# Patient Record
Sex: MALE | Race: AMERICAN INDIAN OR ALASKA NATIVE
[De-identification: names, ages, dates, MRNs, and addresses within clinical notes are randomized per-mention and may not be internally consistent; named-entity substitution may affect disease eponyms.]

---

## 2020-10-03 ENCOUNTER — HOSPITAL ENCOUNTER (EMERGENCY)
Dept: HOSPITAL 5 - ED | Age: 30
Discharge: HOME | End: 2020-10-03
Payer: SELF-PAY

## 2020-10-03 VITALS — SYSTOLIC BLOOD PRESSURE: 130 MMHG | DIASTOLIC BLOOD PRESSURE: 91 MMHG

## 2020-10-03 DIAGNOSIS — Z79.899: ICD-10-CM

## 2020-10-03 DIAGNOSIS — N13.30: Primary | ICD-10-CM

## 2020-10-03 LAB
ALBUMIN SERPL-MCNC: 4.6 G/DL (ref 3.9–5)
ALT SERPL-CCNC: 10 UNITS/L (ref 7–56)
BASOPHILS # (AUTO): 0 K/MM3 (ref 0–0.1)
BASOPHILS NFR BLD AUTO: 0.3 % (ref 0–1.8)
BILIRUB UR QL STRIP: (no result)
BLOOD UR QL VISUAL: (no result)
BUN SERPL-MCNC: 19 MG/DL (ref 9–20)
BUN/CREAT SERPL: 11 %
CALCIUM SERPL-MCNC: 10.3 MG/DL (ref 8.4–10.2)
EOSINOPHIL # BLD AUTO: 0 K/MM3 (ref 0–0.4)
EOSINOPHIL NFR BLD AUTO: 0.1 % (ref 0–4.3)
HCT VFR BLD CALC: 49.3 % (ref 35.5–45.6)
HEMOLYSIS INDEX: 11
HGB BLD-MCNC: 16.8 GM/DL (ref 11.8–15.2)
LYMPHOCYTES # BLD AUTO: 1 K/MM3 (ref 1.2–5.4)
LYMPHOCYTES NFR BLD AUTO: 18.6 % (ref 13.4–35)
MCHC RBC AUTO-ENTMCNC: 34 % (ref 32–34)
MCV RBC AUTO: 85 FL (ref 84–94)
MONOCYTES # (AUTO): 0.4 K/MM3 (ref 0–0.8)
MONOCYTES % (AUTO): 6.4 % (ref 0–7.3)
PH UR STRIP: 7 [PH] (ref 5–7)
PLATELET # BLD: 192 K/MM3 (ref 140–440)
PROT UR STRIP-MCNC: (no result) MG/DL
RBC # BLD AUTO: 5.82 M/MM3 (ref 3.65–5.03)
RBC #/AREA URNS HPF: 1 /HPF (ref 0–6)
UROBILINOGEN UR-MCNC: 2 MG/DL (ref ?–2)
WBC #/AREA URNS HPF: 1 /HPF (ref 0–6)

## 2020-10-03 PROCEDURE — 80053 COMPREHEN METABOLIC PANEL: CPT

## 2020-10-03 PROCEDURE — 96374 THER/PROPH/DIAG INJ IV PUSH: CPT

## 2020-10-03 PROCEDURE — 83690 ASSAY OF LIPASE: CPT

## 2020-10-03 PROCEDURE — 99284 EMERGENCY DEPT VISIT MOD MDM: CPT

## 2020-10-03 PROCEDURE — 81001 URINALYSIS AUTO W/SCOPE: CPT

## 2020-10-03 PROCEDURE — 96375 TX/PRO/DX INJ NEW DRUG ADDON: CPT

## 2020-10-03 PROCEDURE — 96372 THER/PROPH/DIAG INJ SC/IM: CPT

## 2020-10-03 PROCEDURE — 36415 COLL VENOUS BLD VENIPUNCTURE: CPT

## 2020-10-03 PROCEDURE — 85025 COMPLETE CBC W/AUTO DIFF WBC: CPT

## 2020-10-03 PROCEDURE — 96361 HYDRATE IV INFUSION ADD-ON: CPT

## 2020-10-03 PROCEDURE — 74177 CT ABD & PELVIS W/CONTRAST: CPT

## 2020-10-03 NOTE — CAT SCAN REPORT
CT ABDOMEN AND PELVIS WITH IV CONTRAST



INDICATION: Right lower quadrant abdominal pain 



TECHNIQUE: Following the administration of intravenous contrast, multiple axial CT images of the abdo
men and pelvis were acquired. Sagittal and coronal reformats were obtained.  All CT performed at this
 facility utilize dose reduction techniques including automated exposure control, iterative reconstru
ction and weight based dosing when appropriate to reduce patient radiation dose to as low as reasonab
ly achievable.  



COMPARISON: None



FINDINGS:

Limited imaging of the bilateral lung bases shows no evidence of acute abnormality.



Abdomen: The liver, gallbladder, spleen, pancreas and left kidney show no evidence of acute abnormali
ty. There is mild to moderate hydronephrosis of the right kidney. No obstructing renal or ureteral st
one is visualized. The large and small bowel are normal in caliber. The appendix is not clearly ident
ified. No focal right lower quadrant inflammatory changes are seen.



Pelvis: No free fluid is seen within the pelvis. The urinary bladder is decompressed and not well-vis
ualized.



Bones and Soft Tissues: Evaluation of bony structures demonstrates no evidence of acute bony abnormal
ity. Soft tissue structures show no focal abnormality.



IMPRESSION:

1. Mild to moderate right-sided hydronephrosis. The cause for the hydronephrosis is not identified on
 today's study. Please correlate with patient's clinical circumstances.



Signer Name: Ayaka Mcfadden MD 

Signed: 10/3/2020 3:48 PM

Workstation Name: [id number]

## 2020-10-03 NOTE — EMERGENCY DEPARTMENT REPORT
ED Abdominal Pain HPI





- General


Chief Complaint: Abdominal Pain


Stated Complaint: NAUSEA/VOMIT


Time Seen by Provider: 10/03/20 12:35


Source: patient


Mode of arrival: Ambulatory


Limitations: No Limitations





- History of Present Illness


Initial Comments: 





30-year-old male complaining of lower back pain radiating to right lower 

abdominal pain x4 days.  He reports decreased appetite states for over the last 

4 days he has been trying to eat a bland diet but reports vomiting after each 

attempt to eat.  His last bowel movement was 2 days ago.  Patient denies fever 

he denies any difficulty urinating he rates his pain 9 out of 10 patient denies 

any past medical history


MD Complaint: abdominal pain


-: days(s) (4)


Radiation: RLQ


Severity: moderate


Quality: sharp


Improves With: nothing


Worsens With: movement


Associated Symptoms: nausea, vomiting.  denies: diarrhea, fever, chills, 

constipation, hematemesis, melena, hematuria, anorexia, syncope





- Related Data


                                  Previous Rx's











 Medication  Instructions  Recorded  Last Taken  Type


 


traMADoL [Ultram 50 MG tab] 50 mg PO Q6HR PRN #12 tablet 10/03/20 Unknown Rx











                                    Allergies











Allergy/AdvReac Type Severity Reaction Status Date / Time


 


No Known Allergies Allergy   Unverified 10/03/20 10:20














ED Review of Systems


ROS: 


Stated complaint: NAUSEA/VOMIT


Other details as noted in HPI





Comment: All other systems reviewed and negative


Constitutional: see HPI.  denies: chills, fever


Eyes: denies: eye pain


ENT: denies: ear pain


Respiratory: denies: cough


Cardiovascular: denies: chest pain


Endocrine: denies: excessive sweating, flushing, intolerance to cold


Gastrointestinal: abdominal pain, nausea, vomiting.  denies: diarrhea, 

constipation, hematemesis


Genitourinary: denies: urgency, dysuria, frequency


Neurological: denies: headache, weakness, numbness, paresthesias





ED Past Medical Hx





- Past Medical History


Previous Medical History?: No





- Surgical History


Past Surgical History?: No





- Social History


Smoking Status: Never Smoker


Substance Use Type: None





- Medications


Home Medications: 


                                Home Medications











 Medication  Instructions  Recorded  Confirmed  Last Taken  Type


 


traMADoL [Ultram 50 MG tab] 50 mg PO Q6HR PRN #12 tablet 10/03/20  Unknown Rx














ED Physical Exam





- General


Limitations: No Limitations


General appearance: alert, in no apparent distress





- Head


Head exam: Present: atraumatic





- Eye


Eye exam: Present: normal appearance.  Absent: scleral icterus, conjunctival 

injection





- ENT


ENT exam: Present: normal exam





- Neck


Neck exam: Present: normal inspection





- Respiratory


Respiratory exam: Present: normal lung sounds bilaterally.  Absent: respiratory 

distress, wheezes, rales, rhonchi





- Cardiovascular


Cardiovascular Exam: Present: regular rate, normal rhythm, normal heart sounds





- GI/Abdominal


GI/Abdominal exam: Present: soft, tenderness (RLQ tenderness), normal bowel 

sounds.  Absent: distended





- Rectal


Rectal exam: Present: deferred





- Extremities Exam


Extremities exam: Present: normal inspection





- Back Exam


Back exam: Present: normal inspection, CVA tenderness (R).  Absent: CVA 

tenderness (L), vertebral tenderness





- Neurological Exam


Neurological exam: Present: alert, oriented X3





- Psychiatric


Psychiatric exam: Present: normal affect





- Skin


Skin exam: Present: warm, dry, intact, normal color.  Absent: rash





ED Course


                                   Vital Signs











  10/03/20 10/03/20 10/03/20





  10:24 12:54 16:10


 


Temperature 98.5 F  


 


Pulse Rate 73  


 


Respiratory 20 20 18





Rate   


 


Blood Pressure 130/91  


 


O2 Sat by Pulse 100  





Oximetry   














  10/03/20





  17:10


 


Temperature 


 


Pulse Rate 


 


Respiratory 18





Rate 


 


Blood Pressure 


 


O2 Sat by Pulse 





Oximetry 














- Reevaluation(s)


Reevaluation #1: 





10/03/20 15:22


Patient in no acute distress being transported to [facility] scan





ED Medical Decision Making





- Lab Data


Result diagrams: 


                                 10/03/20 12:50





                                 10/03/20 12:50





- Radiology Data


Radiology results: report reviewed





CT ABD/PELVIS


IMPRESSION: 


1. Mild to moderate right-sided hydronephrosis. The cause for the hydronephrosis

is not identified 


 on today's study.





- Medical Decision Making


30-year-old male with right flank pain radiating to his right lower quadrant.  

CT exam revealed moderate amount of right hydro-nephrosis which could signify 

passing of a kidney stone.  Urinalysis is negative for infection negative blood.

 I discussed all findings with patient discussed the importance that he follows 

up with urology for further examination


Critical Care Time: No


Critical care attestation.: 


If time is entered above; I have spent that time in minutes in the direct care 

of this critically ill patient, excluding procedure time.








ED Disposition


Clinical Impression: 


Hydronephrosis


Qualifiers:


 Hydronephrosis type: unspecified Qualified Code(s): N13.30 - Unspecified hydr

onephrosis





Disposition: DC-01 TO HOME OR SELFCARE


Is pt being admited?: No


Does the pt Need Aspirin: No


Condition: Stable


Instructions:  Kidney Stones (ED), Hydronephrosis (ED)


Additional Instructions: 


Drink plenty fluids.  Decrease your diet and cheese milk meats.  Follow-up with 

Urologist Dr. Solis call and make appt.


Return to the emergency room for increasing or worsening abdominal pain fever 

chills vomiting and if you are unable to pee


Prescriptions: 


traMADoL [Ultram 50 MG tab] 50 mg PO Q6HR PRN #12 tablet


 PRN Reason: Pain


Referrals: 


PRIMARY CARE,MD [Primary Care Provider] - 3-5 Days


KIANNA SOLIS MD [Staff Physician] - 3-5 Days


Time of Disposition: 18:01